# Patient Record
Sex: MALE | Race: OTHER
[De-identification: names, ages, dates, MRNs, and addresses within clinical notes are randomized per-mention and may not be internally consistent; named-entity substitution may affect disease eponyms.]

---

## 2018-11-12 PROBLEM — Z00.00 ENCOUNTER FOR PREVENTIVE HEALTH EXAMINATION: Status: ACTIVE | Noted: 2018-11-12

## 2019-01-14 ENCOUNTER — APPOINTMENT (OUTPATIENT)
Dept: PULMONOLOGY | Facility: CLINIC | Age: 69
End: 2019-01-14
Payer: MEDICARE

## 2019-01-14 VITALS
WEIGHT: 186 LBS | DIASTOLIC BLOOD PRESSURE: 90 MMHG | HEIGHT: 70 IN | SYSTOLIC BLOOD PRESSURE: 140 MMHG | BODY MASS INDEX: 26.63 KG/M2 | HEART RATE: 77 BPM | TEMPERATURE: 98.5 F | OXYGEN SATURATION: 96 %

## 2019-01-14 DIAGNOSIS — Z80.0 FAMILY HISTORY OF MALIGNANT NEOPLASM OF DIGESTIVE ORGANS: ICD-10-CM

## 2019-01-14 DIAGNOSIS — Z85.038 PERSONAL HISTORY OF OTHER MALIGNANT NEOPLASM OF LARGE INTESTINE: ICD-10-CM

## 2019-01-14 DIAGNOSIS — E78.00 PURE HYPERCHOLESTEROLEMIA, UNSPECIFIED: ICD-10-CM

## 2019-01-14 DIAGNOSIS — K46.9 UNSPECIFIED ABDOMINAL HERNIA W/OUT OBSTRUCTION OR GANGRENE: ICD-10-CM

## 2019-01-14 DIAGNOSIS — I10 ESSENTIAL (PRIMARY) HYPERTENSION: ICD-10-CM

## 2019-01-14 DIAGNOSIS — Z87.891 PERSONAL HISTORY OF NICOTINE DEPENDENCE: ICD-10-CM

## 2019-01-14 DIAGNOSIS — Z82.49 FAMILY HISTORY OF ISCHEMIC HEART DISEASE AND OTHER DISEASES OF THE CIRCULATORY SYSTEM: ICD-10-CM

## 2019-01-14 DIAGNOSIS — R05 COUGH: ICD-10-CM

## 2019-01-14 DIAGNOSIS — Z87.01 PERSONAL HISTORY OF PNEUMONIA (RECURRENT): ICD-10-CM

## 2019-01-14 DIAGNOSIS — Z78.9 OTHER SPECIFIED HEALTH STATUS: ICD-10-CM

## 2019-01-14 DIAGNOSIS — Z83.3 FAMILY HISTORY OF DIABETES MELLITUS: ICD-10-CM

## 2019-01-14 DIAGNOSIS — R09.82 POSTNASAL DRIP: ICD-10-CM

## 2019-01-14 PROCEDURE — 94060 EVALUATION OF WHEEZING: CPT

## 2019-01-14 PROCEDURE — 94727 GAS DIL/WSHOT DETER LNG VOL: CPT

## 2019-01-14 PROCEDURE — 94729 DIFFUSING CAPACITY: CPT

## 2019-01-14 PROCEDURE — 99204 OFFICE O/P NEW MOD 45 MIN: CPT | Mod: 25

## 2019-01-14 RX ORDER — AMLODIPINE BESYLATE 5 MG/1
5 TABLET ORAL DAILY
Qty: 90 | Refills: 3 | Status: ACTIVE | COMMUNITY
Start: 2019-01-14

## 2019-01-14 RX ORDER — ATORVASTATIN CALCIUM 10 MG/1
10 TABLET, FILM COATED ORAL
Qty: 30 | Refills: 5 | Status: ACTIVE | COMMUNITY
Start: 2019-01-14

## 2019-01-14 NOTE — ASSESSMENT
[FreeTextEntry1] : cough\par \par I discussed the differential diagnosis of chronic cough with the patient.  The differential diagnosis include, but not limited to, GERD, postnasal drip, cardiac, and bronchial asthma.  There is no clinical evidence neither of ILD, OLD, thromboembolic disease, nor infectious process.  His condition sounds like it is related to post nasal gtt and the condition could be related to hyperactive airways.\par \par Reviewed his PFT mild OLD by ratio without any significant response to bronchodilators. Normal TLC and mildly decreased DLCO. Discussed with pt no need for inhalers at this time due to being asymptomatic.  His symptoms of cough improved with discontinue of the ACE inhibitor.  Discussed if he he has illness he may benefit from inhalers at that time.\par \par pt to forward CXR report and CD to us for review.  He does not qualify for LDCT lung cancer screening program.   He is up to date with flu and PNA vaccines. \par \par \par Post nasal gtt\par \par pt to continue with nasal sprays as needed.  No signs of infection at this time.

## 2019-01-14 NOTE — REASON FOR VISIT
[Consultation] : a consultation visit [Cough] : cough [FreeTextEntry2] : Referred by Dr. Ravi Chiang

## 2019-01-14 NOTE — HISTORY OF PRESENT ILLNESS
[Stable] : are stable [Difficulty Breathing During Exertion] : stable dyspnea on exertion [Feelings Of Weakness On Exertion] : stable exercise intolerance [Cough] : stable coughing [Wheezing] : stable wheezing [Regional Soft Tissue Swelling Both Lower Extremities] : stable lower extremity edema [Chest Pain Or Discomfort] : improved chest pain [Fever] : stable fever [FreeTextEntry1] : 68 year old male with PMH HTN, hypercholesteremia and former smoker with history of 20 pack years.  Quit in 1984.  Pt presents today for evaluation of cough.\par \par Pt with complaints of cold like symptoms every winter for the past 4-5 years.  He was currently on ramipril with an increase in dosage.  He took himself off the ramipril a couple weeks ago with some improvement of cough.\par \par He has complaints of post nasal gtt every winter.  If he does not sleep with humidifier he has worsening condition and cough.  He feels his coughing is related to his post nasal gtt.  He sometime has laryngitis and bronchitis that develops during the winter month.  He denies any recent steroids or antibiotic use for his cough.  \par \par Last CXR in March 2018 negative per pt.

## 2019-01-14 NOTE — PHYSICAL EXAM
[General Appearance - Well Developed] : well developed [Normal Appearance] : normal appearance [Well Groomed] : well groomed [General Appearance - Well Nourished] : well nourished [No Deformities] : no deformities [General Appearance - In No Acute Distress] : no acute distress [Normal Conjunctiva] : the conjunctiva exhibited no abnormalities [Eyelids - No Xanthelasma] : the eyelids demonstrated no xanthelasmas [Normal Oropharynx] : normal oropharynx [Neck Appearance] : the appearance of the neck was normal [Neck Cervical Mass (___cm)] : no neck mass was observed [Jugular Venous Distention Increased] : there was no jugular-venous distention [Thyroid Diffuse Enlargement] : the thyroid was not enlarged [Thyroid Nodule] : there were no palpable thyroid nodules [Heart Rate And Rhythm] : heart rate and rhythm were normal [Heart Sounds] : normal S1 and S2 [Murmurs] : no murmurs present [Nail Clubbing] : no clubbing of the fingernails [Cyanosis, Localized] : no localized cyanosis [Petechial Hemorrhages (___cm)] : no petechial hemorrhages [] : no ischemic changes [Deep Tendon Reflexes (DTR)] : deep tendon reflexes were 2+ and symmetric [Sensation] : the sensory exam was normal to light touch and pinprick [No Focal Deficits] : no focal deficits